# Patient Record
Sex: MALE | URBAN - METROPOLITAN AREA
[De-identification: names, ages, dates, MRNs, and addresses within clinical notes are randomized per-mention and may not be internally consistent; named-entity substitution may affect disease eponyms.]

---

## 2019-05-14 ENCOUNTER — APPOINTMENT (RX ONLY)
Dept: URBAN - METROPOLITAN AREA CLINIC 52 | Facility: CLINIC | Age: 34
Setting detail: DERMATOLOGY
End: 2019-05-14

## 2019-05-14 DIAGNOSIS — L21.8 OTHER SEBORRHEIC DERMATITIS: ICD-10-CM

## 2019-05-14 PROCEDURE — ? PRESCRIPTION

## 2019-05-14 RX ORDER — SODIUM SULFACETAMIDE AND SULFUR 80; 40 MG/ML; MG/ML
LOTION TOPICAL
Qty: 1 | Refills: 7 | Status: ERX | COMMUNITY
Start: 2019-05-14

## 2019-05-14 RX ADMIN — SODIUM SULFACETAMIDE AND SULFUR: 80; 40 LOTION TOPICAL at 22:43

## 2021-01-14 ENCOUNTER — APPOINTMENT (RX ONLY)
Dept: URBAN - METROPOLITAN AREA CLINIC 52 | Facility: CLINIC | Age: 36
Setting detail: DERMATOLOGY
End: 2021-01-14

## 2021-01-14 DIAGNOSIS — Z41.9 ENCOUNTER FOR PROCEDURE FOR PURPOSES OTHER THAN REMEDYING HEALTH STATE, UNSPECIFIED: ICD-10-CM

## 2021-01-14 PROCEDURE — ? BOTOX

## 2021-01-14 NOTE — PROCEDURE: BOTOX
Dilution (U/0.1 Cc): 4
Nasal Root Units: 0
Show Masseter Units: Yes
Consent: Written consent obtained. Risks include but not limited to lid/brow ptosis, bruising, swelling, diplopia, temporary effect, incomplete chemical denervation.
Show Right And Left Pupillary Line Units: No
Forehead Units: 20
Additional Area 1 Location: 4 of the forehead units in mid brow to create arch. 2 on each side
Post-Care Instructions: Patient instructed to not lie down for 4 hours and limit physical activity for 24 hours. Patient instructed not to travel by airplane for 48 hours.
Glabellar Complex Units: 20
Periorbital Skin Units: 12
Detail Level: Detailed

## 2021-06-03 ENCOUNTER — RX ONLY (OUTPATIENT)
Age: 36
Setting detail: RX ONLY
End: 2021-06-03

## 2021-06-03 RX ORDER — PHARMACY COMPOUNDING ACCESSORY
EACH MISCELLANEOUS
Qty: 120 | Refills: 6 | Status: ERX | COMMUNITY
Start: 2021-06-02

## 2021-06-03 RX ORDER — MINOXIDIL 2.5 MG/1
2.5MG TABLET ORAL
Qty: 60 | Refills: 6 | Status: ERX | COMMUNITY
Start: 2021-06-02

## 2021-06-03 RX ORDER — SODIUM SULFACETAMIDE AND SULFUR 80; 40 MG/ML; MG/ML
LOTION TOPICAL
Qty: 473 | Refills: 11 | Status: ERX

## 2021-06-07 ENCOUNTER — RX ONLY (OUTPATIENT)
Age: 36
Setting detail: RX ONLY
End: 2021-06-07

## 2021-06-07 RX ORDER — FINASTERIDE 5 MG/1
5MG TABLET, FILM COATED ORAL
Qty: 90 | Refills: 3 | Status: ERX | COMMUNITY
Start: 2021-06-07

## 2021-11-18 ENCOUNTER — RX ONLY (OUTPATIENT)
Age: 36
Setting detail: RX ONLY
End: 2021-11-18

## 2021-11-18 RX ORDER — PHARMACY COMPOUNDING ACCESSORY
EACH MISCELLANEOUS
Qty: 60 | Refills: 12 | Status: ERX

## 2022-01-07 ENCOUNTER — RX ONLY (OUTPATIENT)
Age: 37
Setting detail: RX ONLY
End: 2022-01-07

## 2022-01-07 RX ORDER — SODIUM SULFACETAMIDE 100 MG/ML
10% LIQUID TOPICAL
Qty: 355 | Refills: 11 | Status: ERX | COMMUNITY
Start: 2022-01-07

## 2022-01-07 RX ORDER — HYDROCORTISONE 25 MG/G
2.5% OINTMENT TOPICAL
Qty: 28.35 | Refills: 0 | Status: ERX | COMMUNITY
Start: 2022-01-07

## 2022-01-07 RX ORDER — MINOXIDIL 10 MG/1
10MG TABLET ORAL
Qty: 90 | Refills: 6 | Status: ERX | COMMUNITY
Start: 2022-01-07

## 2022-01-20 ENCOUNTER — RX ONLY (OUTPATIENT)
Age: 37
Setting detail: RX ONLY
End: 2022-01-20

## 2022-01-20 RX ORDER — SODIUM SULFACETAMIDE AND SULFUR 100; 50 MG/ML; MG/ML
SOLUTION TOPICAL
Qty: 170 | Refills: 11 | Status: ERX | COMMUNITY
Start: 2022-01-20

## 2022-02-22 ENCOUNTER — RX ONLY (OUTPATIENT)
Age: 37
Setting detail: RX ONLY
End: 2022-02-22

## 2022-02-22 RX ORDER — SODIUM SULFACETAMIDE AND SULFUR 80; 40 MG/ML; MG/ML
LOTION TOPICAL
Qty: 473 | Refills: 11 | Status: ERX

## 2022-05-26 ENCOUNTER — RX ONLY (OUTPATIENT)
Age: 37
Setting detail: RX ONLY
End: 2022-05-26

## 2022-05-26 ENCOUNTER — APPOINTMENT (RX ONLY)
Dept: URBAN - METROPOLITAN AREA CLINIC 93 | Facility: CLINIC | Age: 37
Setting detail: DERMATOLOGY
End: 2022-05-26

## 2022-05-26 DIAGNOSIS — L81.0 POSTINFLAMMATORY HYPERPIGMENTATION: ICD-10-CM

## 2022-05-26 PROCEDURE — ? PRESCRIPTION

## 2022-05-26 RX ORDER — HYDROQUINONE 6% 6 G/100G
6% EMULSION TOPICAL QHS
Qty: 50 | Refills: 11 | Status: ERX | COMMUNITY
Start: 2022-05-26

## 2022-05-26 RX ORDER — MINOXIDIL 10 MG/1
10MG TABLET ORAL
Qty: 90 | Refills: 3 | Status: ERX

## 2022-12-14 ENCOUNTER — APPOINTMENT (RX ONLY)
Dept: URBAN - METROPOLITAN AREA CLINIC 93 | Facility: CLINIC | Age: 37
Setting detail: DERMATOLOGY
End: 2022-12-14

## 2022-12-14 DIAGNOSIS — Z41.9 ENCOUNTER FOR PROCEDURE FOR PURPOSES OTHER THAN REMEDYING HEALTH STATE, UNSPECIFIED: ICD-10-CM

## 2022-12-14 PROCEDURE — ? ECLIPSE MICROPEN

## 2022-12-14 ASSESSMENT — LOCATION SIMPLE DESCRIPTION DERM
LOCATION SIMPLE: NOSE
LOCATION SIMPLE: LEFT CHEEK
LOCATION SIMPLE: RIGHT CHEEK
LOCATION SIMPLE: SUPERIOR FOREHEAD

## 2022-12-14 ASSESSMENT — LOCATION ZONE DERM
LOCATION ZONE: FACE
LOCATION ZONE: NOSE

## 2022-12-14 ASSESSMENT — LOCATION DETAILED DESCRIPTION DERM
LOCATION DETAILED: LEFT CENTRAL MALAR CHEEK
LOCATION DETAILED: NASAL DORSUM
LOCATION DETAILED: RIGHT CENTRAL MALAR CHEEK
LOCATION DETAILED: SUPERIOR MID FOREHEAD

## 2022-12-14 NOTE — PROCEDURE: ECLIPSE MICROPEN
Prp Used In Ccs: 0
Location #4: Lower Orbital Rim
Depth In Mm: 1.7
Length Of Topical Anesthesia Application (Optional): 20 minutes
Depth In Mm: 1.5
Depth In Mm: 0.5
Depth In Mm: 0.1
Location #1: Forehead
Consent: Written consent obtained, risks reviewed including but not limited to pain, scarring, infection and incomplete improvement.  Patient understands the procedure is cosmetic in nature and will require out of pocket payment.
Pre-Procedure Text: The treatment areas were cleansed in the usual fashion and treatment proceeded as outlined above. 6-8 passes performed in each area. All proper protocols performed. Post tx peptide serum applied.
Depth In Mm: 1
Location #2: Nose
Post-Care Instructions: After the procedure, take precautions agains sun exposure. Do not apply sunscreen for 12 hours after the procedure. Do not apply make-up for 12 hours after the procedure. Avoid alcohol based toners for 10-14 days. After 2-3 days patients can return to their regular skin regimen.\\n\\nPost care instructions thoroughly reviewed. New mask given.
Detail Level: Generalized
Topical Anesthesia Type: BLT cream (benzocaine 20%, lidocaine 10%, tetracaine 10%)
Location #3: Cheeks
Topical (Optional): Hyaluronic Acid Peptide Peel

## 2023-01-11 ENCOUNTER — APPOINTMENT (RX ONLY)
Dept: URBAN - METROPOLITAN AREA CLINIC 93 | Facility: CLINIC | Age: 38
Setting detail: DERMATOLOGY
End: 2023-01-11

## 2023-01-11 DIAGNOSIS — Z41.9 ENCOUNTER FOR PROCEDURE FOR PURPOSES OTHER THAN REMEDYING HEALTH STATE, UNSPECIFIED: ICD-10-CM

## 2023-01-11 PROCEDURE — ? ECLIPSE MICROPEN

## 2023-01-11 ASSESSMENT — LOCATION DETAILED DESCRIPTION DERM
LOCATION DETAILED: SUPERIOR MID FOREHEAD
LOCATION DETAILED: RIGHT CENTRAL MALAR CHEEK
LOCATION DETAILED: LEFT CENTRAL MALAR CHEEK
LOCATION DETAILED: NASAL DORSUM

## 2023-01-11 ASSESSMENT — LOCATION SIMPLE DESCRIPTION DERM
LOCATION SIMPLE: LEFT CHEEK
LOCATION SIMPLE: SUPERIOR FOREHEAD
LOCATION SIMPLE: RIGHT CHEEK
LOCATION SIMPLE: NOSE

## 2023-01-11 ASSESSMENT — LOCATION ZONE DERM
LOCATION ZONE: NOSE
LOCATION ZONE: FACE

## 2023-01-11 NOTE — PROCEDURE: ECLIPSE MICROPEN
Location #1: Forehead
Depth In Mm: 1.7
Topical (Optional): Hyaluronic Acid Peptide Peel
Prp Used In Ccs: 0
Depth In Mm: 0.1
Depth In Mm: 0.5
Depth In Mm: 1.5
Consent: Written consent obtained, risks reviewed including but not limited to pain, scarring, infection and incomplete improvement.  Patient understands the procedure is cosmetic in nature and will require out of pocket payment.
Other Topicals Used: TXA
Pre-Procedure Text: The treatment areas were cleansed in the usual fashion and treatment proceeded as outlined above. 6-8 passes performed in each area. All proper protocols performed. Post tx peptide serum applied.
Post-Care Instructions: After the procedure, take precautions agains sun exposure. Do not apply sunscreen for 12 hours after the procedure. Do not apply make-up for 12 hours after the procedure. Avoid alcohol based toners for 10-14 days. After 2-3 days patients can return to their regular skin regimen.\\n\\nPost care instructions thoroughly reviewed. New mask given.
Length Of Topical Anesthesia Application (Optional): 30 minutes
Treatment Number (Optional): 2
Topical Anesthesia Type: BLT cream (benzocaine 20%, lidocaine 10%, tetracaine 10%)
Detail Level: Generalized

## 2023-02-09 ENCOUNTER — RX ONLY (OUTPATIENT)
Age: 38
Setting detail: RX ONLY
End: 2023-02-09

## 2023-02-09 RX ORDER — HYDROCORTISONE 25 MG/G
OINTMENT TOPICAL
Qty: 28.35 | Refills: 0 | Status: ERX

## 2023-04-20 ENCOUNTER — RX ONLY (OUTPATIENT)
Age: 38
Setting detail: RX ONLY
End: 2023-04-20

## 2023-04-20 RX ORDER — MINOXIDIL 10 MG/1
TABLET ORAL
Qty: 90 | Refills: 11 | Status: ERX

## 2023-08-22 ENCOUNTER — RX ONLY (OUTPATIENT)
Age: 38
Setting detail: RX ONLY
End: 2023-08-22

## 2023-08-22 RX ORDER — HYDROQUINONE 4 %
CREAM (GRAM) TOPICAL
Qty: 30 | Refills: 6 | Status: ERX | COMMUNITY
Start: 2023-08-22

## 2023-08-22 RX ORDER — MINOXIDIL 10 MG/1
10MG TABLET ORAL
Qty: 90 | Refills: 4 | Status: ERX

## 2023-08-22 RX ORDER — TRANEXAMIC ACID 650 MG/1
650MG TABLET, FILM COATED ORAL
Qty: 90 | Refills: 4 | Status: ERX | COMMUNITY
Start: 2023-08-22

## 2023-08-24 ENCOUNTER — RX ONLY (OUTPATIENT)
Age: 38
Setting detail: RX ONLY
End: 2023-08-24

## 2023-08-24 RX ORDER — MINOXIDIL 10 MG/1
10MG TABLET ORAL
Qty: 90 | Refills: 4 | Status: ERX

## 2024-09-12 ENCOUNTER — RX ONLY (OUTPATIENT)
Age: 39
Setting detail: RX ONLY
End: 2024-09-12

## 2024-09-12 RX ORDER — PHARMACY COMPOUNDING ACCESSORY
EACH MISCELLANEOUS
Qty: 30 | Refills: 12 | Status: ERX

## 2024-09-30 ENCOUNTER — APPOINTMENT (RX ONLY)
Dept: URBAN - METROPOLITAN AREA CLINIC 93 | Facility: CLINIC | Age: 39
Setting detail: DERMATOLOGY
End: 2024-09-30

## 2024-09-30 DIAGNOSIS — L73.8 OTHER SPECIFIED FOLLICULAR DISORDERS: ICD-10-CM

## 2024-09-30 DIAGNOSIS — B07.8 OTHER VIRAL WARTS: ICD-10-CM

## 2024-09-30 PROCEDURE — ? BENIGN DESTRUCTION COSMETIC

## 2024-09-30 ASSESSMENT — LOCATION DETAILED DESCRIPTION DERM
LOCATION DETAILED: LEFT SUPERIOR LATERAL MALAR CHEEK
LOCATION DETAILED: LEFT SUPERIOR MEDIAL FOREHEAD
LOCATION DETAILED: LEFT INFERIOR LID MARGIN
LOCATION DETAILED: LEFT MEDIAL FOREHEAD
LOCATION DETAILED: RIGHT MEDIAL FOREHEAD

## 2024-09-30 ASSESSMENT — LOCATION SIMPLE DESCRIPTION DERM
LOCATION SIMPLE: RIGHT FOREHEAD
LOCATION SIMPLE: LEFT CHEEK
LOCATION SIMPLE: LEFT INFERIOR EYELID
LOCATION SIMPLE: LEFT FOREHEAD

## 2024-09-30 ASSESSMENT — LOCATION ZONE DERM
LOCATION ZONE: FACE
LOCATION ZONE: EYELID

## 2024-09-30 NOTE — PROCEDURE: BENIGN DESTRUCTION COSMETIC
Post-Care Instructions: I reviewed with the patient in detail post-care instructions. Patient is to wear sunprotection, and avoid picking at any of the treated lesions. Pt may apply Vaseline to crusted or scabbing areas.
Anesthesia Volume In Cc: 0.1
Detail Level: Detailed
Consent: The patient's consent was obtained including but not limited to risks of crusting, scabbing, blistering, scarring, darker or lighter pigmentary change, recurrence, incomplete removal and infection.

## 2024-11-27 ENCOUNTER — RX ONLY (OUTPATIENT)
Age: 39
Setting detail: RX ONLY
End: 2024-11-27

## 2024-11-27 RX ORDER — MINOXIDIL 10 MG/1
5MG TABLET ORAL
Qty: 90 | Refills: 6 | Status: ERX | COMMUNITY
Start: 2024-11-27